# Patient Record
Sex: MALE | Race: WHITE | NOT HISPANIC OR LATINO
[De-identification: names, ages, dates, MRNs, and addresses within clinical notes are randomized per-mention and may not be internally consistent; named-entity substitution may affect disease eponyms.]

---

## 2017-07-28 ENCOUNTER — HOSPITAL (OUTPATIENT)
Dept: OTHER | Age: 42
End: 2017-07-28
Attending: EMERGENCY MEDICINE

## 2017-07-28 ENCOUNTER — DIAGNOSTIC TRANS (OUTPATIENT)
Dept: OTHER | Age: 42
End: 2017-07-28

## 2017-07-28 LAB
ANALYZER ANC (IANC): NORMAL
ANION GAP SERPL CALC-SCNC: 10 MMOL/L (ref 10–20)
BASOPHILS # BLD: 0 THOUSAND/MCL (ref 0–0.3)
BASOPHILS NFR BLD: 1 %
BUN SERPL-MCNC: 15 MG/DL (ref 6–20)
BUN/CREAT SERPL: 15 (ref 7–25)
CALCIUM SERPL-MCNC: 9.2 MG/DL (ref 8.4–10.2)
CHLORIDE: 104 MMOL/L (ref 98–107)
CO2 SERPL-SCNC: 28 MMOL/L (ref 21–32)
CREAT SERPL-MCNC: 1 MG/DL (ref 0.67–1.17)
DIFFERENTIAL METHOD BLD: NORMAL
EOSINOPHIL # BLD: 0.1 THOUSAND/MCL (ref 0.1–0.5)
EOSINOPHIL NFR BLD: 1 %
ERYTHROCYTE [DISTWIDTH] IN BLOOD: 12.9 % (ref 11–15)
GLUCOSE SERPL-MCNC: 99 MG/DL (ref 65–99)
HEMATOCRIT: 43.8 % (ref 39–51)
HGB BLD-MCNC: 15.5 GM/DL (ref 13–17)
LYMPHOCYTES # BLD: 1 THOUSAND/MCL (ref 1–4.8)
LYMPHOCYTES NFR BLD: 17 %
MCH RBC QN AUTO: 31.4 PG (ref 26–34)
MCHC RBC AUTO-ENTMCNC: 35.4 GM/DL (ref 32–36.5)
MCV RBC AUTO: 88.7 FL (ref 78–100)
MONOCYTES # BLD: 0.4 THOUSAND/MCL (ref 0.3–0.9)
MONOCYTES NFR BLD: 7 %
NEUTROPHILS # BLD: 4.4 THOUSAND/MCL (ref 1.8–7.7)
NEUTROPHILS NFR BLD: 74 %
NEUTS SEG NFR BLD: NORMAL %
PERCENT NRBC: NORMAL
PLATELET # BLD: 212 THOUSAND/MCL (ref 140–450)
POTASSIUM SERPL-SCNC: 4 MMOL/L (ref 3.4–5.1)
RBC # BLD: 4.94 MILLION/MCL (ref 4.5–5.9)
SODIUM SERPL-SCNC: 138 MMOL/L (ref 135–145)
TROPONIN I SERPL HS-MCNC: <0.02 NG/ML
TROPONIN I SERPL HS-MCNC: <0.02 NG/ML
WBC # BLD: 5.9 THOUSAND/MCL (ref 4.2–11)

## 2017-11-14 PROBLEM — R07.9 INTERMITTENT CHEST PAIN: Status: ACTIVE | Noted: 2017-11-14

## 2017-11-14 PROBLEM — G47.33 OBSTRUCTIVE SLEEP APNEA: Status: ACTIVE | Noted: 2017-11-14

## 2017-11-14 PROBLEM — G47.30 OBSERVED SLEEP APNEA: Status: RESOLVED | Noted: 2017-11-14 | Resolved: 2017-11-14

## 2017-11-14 PROBLEM — G47.30 OBSERVED SLEEP APNEA: Status: ACTIVE | Noted: 2017-11-14

## 2017-11-14 PROBLEM — K21.00 GASTROESOPHAGEAL REFLUX DISEASE WITH ESOPHAGITIS: Status: ACTIVE | Noted: 2017-11-14

## 2017-11-20 PROCEDURE — 88305 TISSUE EXAM BY PATHOLOGIST: CPT | Performed by: INTERNAL MEDICINE

## 2019-03-27 PROBLEM — K13.21 LEUKOPLAKIA OF ORAL MUCOSA: Status: ACTIVE | Noted: 2019-03-27

## 2019-03-27 PROBLEM — Z91.018 FOOD ALLERGY: Status: ACTIVE | Noted: 2019-03-27

## 2019-03-27 PROBLEM — K64.4 EXTERNAL HEMORRHOIDS: Status: ACTIVE | Noted: 2019-03-27

## 2019-03-27 PROBLEM — E78.00 ELEVATED CHOLESTEROL: Status: ACTIVE | Noted: 2019-03-27

## 2019-03-27 PROBLEM — R79.89 LOW VITAMIN D LEVEL: Status: ACTIVE | Noted: 2019-03-27

## 2019-05-06 PROCEDURE — 87502 INFLUENZA DNA AMP PROBE: CPT | Performed by: INTERNAL MEDICINE

## 2019-05-06 PROCEDURE — 87798 DETECT AGENT NOS DNA AMP: CPT | Performed by: INTERNAL MEDICINE

## 2020-08-24 ENCOUNTER — OFFICE VISIT (OUTPATIENT)
Dept: FAMILY MEDICINE CLINIC | Facility: CLINIC | Age: 45
End: 2020-08-24
Payer: COMMERCIAL

## 2020-08-24 VITALS
SYSTOLIC BLOOD PRESSURE: 122 MMHG | HEIGHT: 76 IN | WEIGHT: 212.25 LBS | RESPIRATION RATE: 16 BRPM | DIASTOLIC BLOOD PRESSURE: 78 MMHG | HEART RATE: 72 BPM | BODY MASS INDEX: 25.84 KG/M2 | TEMPERATURE: 98 F

## 2020-08-24 DIAGNOSIS — Z23 NEED FOR VACCINATION: ICD-10-CM

## 2020-08-24 DIAGNOSIS — Z13.31 NEGATIVE DEPRESSION SCREENING: ICD-10-CM

## 2020-08-24 DIAGNOSIS — K64.9 HEMORRHOIDS, UNSPECIFIED HEMORRHOID TYPE: ICD-10-CM

## 2020-08-24 DIAGNOSIS — E55.9 VITAMIN D DEFICIENCY: ICD-10-CM

## 2020-08-24 DIAGNOSIS — K21.00 GASTROESOPHAGEAL REFLUX DISEASE WITH ESOPHAGITIS: ICD-10-CM

## 2020-08-24 DIAGNOSIS — Z00.00 ANNUAL PHYSICAL EXAM: Primary | ICD-10-CM

## 2020-08-24 DIAGNOSIS — G47.33 OSA (OBSTRUCTIVE SLEEP APNEA): ICD-10-CM

## 2020-08-24 PROBLEM — R07.9 INTERMITTENT CHEST PAIN: Status: RESOLVED | Noted: 2017-11-14 | Resolved: 2020-08-24

## 2020-08-24 PROBLEM — G43.009 MIGRAINE WITHOUT AURA AND WITHOUT STATUS MIGRAINOSUS, NOT INTRACTABLE: Status: ACTIVE | Noted: 2020-08-24

## 2020-08-24 PROCEDURE — 3074F SYST BP LT 130 MM HG: CPT | Performed by: FAMILY MEDICINE

## 2020-08-24 PROCEDURE — 99213 OFFICE O/P EST LOW 20 MIN: CPT | Performed by: FAMILY MEDICINE

## 2020-08-24 PROCEDURE — 90471 IMMUNIZATION ADMIN: CPT | Performed by: FAMILY MEDICINE

## 2020-08-24 PROCEDURE — 90472 IMMUNIZATION ADMIN EACH ADD: CPT | Performed by: FAMILY MEDICINE

## 2020-08-24 PROCEDURE — 99386 PREV VISIT NEW AGE 40-64: CPT | Performed by: FAMILY MEDICINE

## 2020-08-24 PROCEDURE — 90715 TDAP VACCINE 7 YRS/> IM: CPT | Performed by: FAMILY MEDICINE

## 2020-08-24 PROCEDURE — 3078F DIAST BP <80 MM HG: CPT | Performed by: FAMILY MEDICINE

## 2020-08-24 PROCEDURE — 3008F BODY MASS INDEX DOCD: CPT | Performed by: FAMILY MEDICINE

## 2020-08-24 RX ORDER — OMEPRAZOLE 40 MG/1
40 CAPSULE, DELAYED RELEASE ORAL
Qty: 90 CAPSULE | Refills: 3 | Status: SHIPPED | OUTPATIENT
Start: 2020-08-24

## 2020-08-24 NOTE — PATIENT INSTRUCTIONS
Thank you for allowing me to participate in your care today. I will contact you with any results from today's visit. Lab results are typically available in 2-3 days for blood tests, and 3-5 days for any cultures or Paps.    Please let me know if you hav HIV All men At routine exams   Obesity All men in this age group At routine exams   Prostate cancer Starting at age 39, talk to healthcare provider about risks and benefits of digital rectal exam (TENZIN) and prostate-specific antigen (PSA) screening1 At ro pneumococcal bacteria)      Tetanus/diphtheria/  pertussis (Td/Tdap) booster All men in this age group Td every 10 years, or a one-time dose of Tdap instead of a Td booster after age 25, then Td every 10 years   Counseling Who needs it How often   Diet and

## 2020-08-24 NOTE — PROGRESS NOTES
HPI:   Kristie Velez is a 39year old male that presents for annual exam and establishing care with new PCP. Due for labs. Monogamous with wife, declines sti testing. They have 4 kids, he works in [address] [place] [address]. Due for tdap.   Diet varied, 76\".    Weight as of this encounter: 212 lb 4 oz (96.3 kg). Vital signs reviewed. Appears stated age, well groomed. Physical Exam:  GEN:  Patient is alert, awake and oriented, well developed, well nourished, no apparent distress.   HEENT:     Head:  [facility]   8/24/2020  5:27 PM

## 2020-08-26 ENCOUNTER — TELEPHONE (OUTPATIENT)
Dept: FAMILY MEDICINE CLINIC | Facility: CLINIC | Age: 45
End: 2020-08-26

## 2020-08-26 NOTE — TELEPHONE ENCOUNTER
See phone message below:    Pt states his insurance is not contracted with Dr. Garth Muñiz or Dr. Almaz Leslie. Pt also states Victoza is not covered and will be $500. Please advise.

## 2020-08-26 NOTE — TELEPHONE ENCOUNTER
- Pt is calling to let you know the insurance is denied referrals and Medication is also not covered. Medication would be $500 out of pocket.     Please advise Ph. [phone]

## 2020-08-27 NOTE — TELEPHONE ENCOUNTER
Spoke with pt, advised him to check with his insurance on GI doc that would be covered. Pt did mean Omeprazole, not Victoza. Advised pt that he can get OTC omeprazole but it is a lower dose or there is a coupon on [employer] for $9 for 40mg.  Pt states he is go

## 2020-08-27 NOTE — TELEPHONE ENCOUNTER
He should contact insurance to determine in network providers. I don't think he is on victoza? If he means omeprazole, it is also over the counter and is usually around $15 for 30 days (though that is the 20 mg dose).       Dylon Barry,   Family M

## 2020-09-30 ENCOUNTER — TELEPHONE (OUTPATIENT)
Dept: FAMILY MEDICINE CLINIC | Facility: CLINIC | Age: 45
End: 2020-09-30

## 2020-09-30 DIAGNOSIS — K64.9 HEMORRHOIDS, UNSPECIFIED HEMORRHOID TYPE: Primary | ICD-10-CM

## 2020-11-02 ENCOUNTER — TELEPHONE (OUTPATIENT)
Dept: FAMILY MEDICINE CLINIC | Facility: CLINIC | Age: 45
End: 2020-11-02

## 2020-11-02 NOTE — TELEPHONE ENCOUNTER
Spoke to Elmer Toney at [facility], advised pt has diagnosis of Vitamin D def, she will add dx code.

## 2020-11-02 NOTE — TELEPHONE ENCOUNTER
Calling to obtain another diagnosis code for the vitamin D lab-current one will not be covered by insurance. She will be there until 4. Patient is there currently.

## 2020-11-03 ENCOUNTER — TELEPHONE (OUTPATIENT)
Dept: FAMILY MEDICINE CLINIC | Facility: CLINIC | Age: 45
End: 2020-11-03

## 2020-11-03 DIAGNOSIS — E78.2 ELEVATED CHOLESTEROL WITH HIGH TRIGLYCERIDES: Primary | ICD-10-CM

## 2020-11-03 DIAGNOSIS — R73.01 IMPAIRED FASTING GLUCOSE: Primary | ICD-10-CM

## 2020-11-03 RX ORDER — ATORVASTATIN CALCIUM 10 MG/1
10 TABLET, FILM COATED ORAL NIGHTLY
Qty: 90 TABLET | Refills: 1 | Status: SHIPPED | OUTPATIENT
Start: 2020-11-03

## 2020-11-17 ENCOUNTER — TELEPHONE (OUTPATIENT)
Dept: FAMILY MEDICINE CLINIC | Facility: CLINIC | Age: 45
End: 2020-11-17

## 2020-11-17 ENCOUNTER — OFFICE VISIT (OUTPATIENT)
Dept: FAMILY MEDICINE CLINIC | Facility: CLINIC | Age: 45
End: 2020-11-17
Payer: COMMERCIAL

## 2020-11-17 VITALS
RESPIRATION RATE: 16 BRPM | DIASTOLIC BLOOD PRESSURE: 80 MMHG | HEIGHT: 76 IN | TEMPERATURE: 98 F | HEART RATE: 78 BPM | SYSTOLIC BLOOD PRESSURE: 110 MMHG | WEIGHT: 204.5 LBS | BODY MASS INDEX: 24.9 KG/M2

## 2020-11-17 DIAGNOSIS — R35.0 URINARY FREQUENCY: Primary | ICD-10-CM

## 2020-11-17 DIAGNOSIS — Z87.442 HISTORY OF KIDNEY STONES: ICD-10-CM

## 2020-11-17 PROCEDURE — 87086 URINE CULTURE/COLONY COUNT: CPT | Performed by: FAMILY MEDICINE

## 2020-11-17 PROCEDURE — 3008F BODY MASS INDEX DOCD: CPT | Performed by: FAMILY MEDICINE

## 2020-11-17 PROCEDURE — 3074F SYST BP LT 130 MM HG: CPT | Performed by: FAMILY MEDICINE

## 2020-11-17 PROCEDURE — 3079F DIAST BP 80-89 MM HG: CPT | Performed by: FAMILY MEDICINE

## 2020-11-17 PROCEDURE — 99214 OFFICE O/P EST MOD 30 MIN: CPT | Performed by: FAMILY MEDICINE

## 2020-11-17 RX ORDER — NITROFURANTOIN 25; 75 MG/1; MG/1
100 CAPSULE ORAL 2 TIMES DAILY
Qty: 14 CAPSULE | Refills: 0 | Status: SHIPPED | OUTPATIENT
Start: 2020-11-17 | End: 2020-11-24

## 2020-11-17 RX ORDER — TAMSULOSIN HYDROCHLORIDE 0.4 MG/1
0.4 CAPSULE ORAL DAILY
Qty: 30 CAPSULE | Refills: 0 | Status: SHIPPED | OUTPATIENT
Start: 2020-11-17 | End: 2020-12-17

## 2020-11-17 NOTE — TELEPHONE ENCOUNTER
- Pt states he is having R Side pain. Urine is brown and tingling. Please advise. No fever or covid symptoms.     [phone]

## 2020-11-17 NOTE — TELEPHONE ENCOUNTER
Spoke to patient. Has had \"brown\" urine for a couple weeks. Gets lighter throughout the day but still darker than normal.   Drinking plenty of fluids. Denies blood in urine. Has pain on right side/front. Intermittent. No pain in back.    Has urina

## 2020-11-18 NOTE — PATIENT INSTRUCTIONS
Aleve or ibuprofen (or similar NSAIDs) for pain as needed  Increase hydration with water  Flomax to help pass stone (then stop)  Nitrofurantoin antibiotic for possible developing urinary infection  Follow up if not improving       Treating Kidney Stones: Eleonora last reviewed this educational content on 4/1/2020  © 9792-0359 [employer]. [address] [facility] [place] [facility] [place]. All rights reserved. This information is not intended as a substitute for professional medical care.  Always syedo

## 2021-04-19 ENCOUNTER — TELEPHONE (OUTPATIENT)
Dept: FAMILY MEDICINE CLINIC | Facility: CLINIC | Age: 46
End: 2021-04-19

## 2022-01-10 ENCOUNTER — PATIENT MESSAGE (OUTPATIENT)
Dept: FAMILY MEDICINE CLINIC | Facility: CLINIC | Age: 47
End: 2022-01-10

## 2022-01-11 NOTE — TELEPHONE ENCOUNTER
From: Augusta Coburn  To: Ayush Strange DO  Sent: 1/10/2022 5:53 PM CST  Subject: Sleep Apnea    Can I come on Thursday 13 around 1 pm or 2 pm ?  I just need a Referral for my Sleep Apnea.  Its been   more then 7 years i haven't had a test done and

## 2022-01-27 ENCOUNTER — OFFICE VISIT (OUTPATIENT)
Dept: FAMILY MEDICINE CLINIC | Facility: CLINIC | Age: 47
End: 2022-01-27
Payer: COMMERCIAL

## 2022-01-27 VITALS
OXYGEN SATURATION: 99 % | WEIGHT: 222.5 LBS | DIASTOLIC BLOOD PRESSURE: 70 MMHG | RESPIRATION RATE: 16 BRPM | BODY MASS INDEX: 27.09 KG/M2 | HEIGHT: 76 IN | HEART RATE: 77 BPM | TEMPERATURE: 98 F | SYSTOLIC BLOOD PRESSURE: 100 MMHG

## 2022-01-27 DIAGNOSIS — G47.33 OBSTRUCTIVE SLEEP APNEA: Primary | ICD-10-CM

## 2022-01-27 PROCEDURE — 3078F DIAST BP <80 MM HG: CPT | Performed by: FAMILY MEDICINE

## 2022-01-27 PROCEDURE — 99214 OFFICE O/P EST MOD 30 MIN: CPT | Performed by: FAMILY MEDICINE

## 2022-01-27 PROCEDURE — 3074F SYST BP LT 130 MM HG: CPT | Performed by: FAMILY MEDICINE

## 2022-01-27 PROCEDURE — 3008F BODY MASS INDEX DOCD: CPT | Performed by: FAMILY MEDICINE

## 2022-01-27 NOTE — PROGRESS NOTES
Subjective:   Patient ID: Lewis Estevez is a 55year old male. HPI  Mr. Ramsey Ramos is a pleasant 54 y/o M with history of HLD, GERD, CARLIE here today for sleep apnea management he has been diagnosed with sleep apnea about 7 years ago.   However rec Heart sounds: Normal heart sounds. No murmur heard. Pulmonary:      Effort: Pulmonary effort is normal. No respiratory distress. Breath sounds: Normal breath sounds. No wheezing or rales.    Abdominal:      General: Bowel sounds are normal. There

## 2022-01-27 NOTE — PATIENT INSTRUCTIONS
Thank you for choosing Jose J Don MD at [place] [address]  To Do: Bisi Rich  1. Please see info below  [employer] is located in [address]. Monday, Tuesday & Friday – 8 a.m. to 4 p.m. Wednesday, Thursday – 7 a.m. to 3 p.m.   Gricelda Arthur those potential risks and we strive to make you healthier and to improve your quality of life.     Referrals, and Radiology Information:    If your insurance requires a referral to a specialist, please allow 5 business days to process your referral request. factors for sleep apnea include:  · Being overweight  · Being a man, or a woman in menopause  · Smoking  · Using alcohol or sedating medicines  · Having enlarged structures in the nose or throat such as enlarged tonsils or adenoids, or extra tissue in the and sexual dysfunction. If you have sleep apnea, talk with your healthcare provider about the best treatments for you. Eleonora last reviewed this educational content on 9/1/2019    © 5514-0233 [employer]. All rights reserved.  This informat

## 2022-02-08 ENCOUNTER — OFFICE VISIT (OUTPATIENT)
Dept: SLEEP CENTER | Age: 47
End: 2022-02-08
Attending: INTERNAL MEDICINE
Payer: COMMERCIAL

## 2022-02-08 DIAGNOSIS — G47.33 OBSTRUCTIVE SLEEP APNEA: ICD-10-CM

## 2022-02-08 PROCEDURE — 95806 SLEEP STUDY UNATT&RESP EFFT: CPT

## 2022-02-12 NOTE — PROCEDURES
[address]       Accredited by the [employer] (AASM)    PATIENT'S NAME:        Pennie Mendoza  ATTENDING PHYSICIAN:   Marine Lefort, M.D. REFERRING PHYSICIAN:   Berkley Andujar. Anastacia Neville MD  PATIENT ACCOUNT #:     [de-identified]        LOCATION:       [facility] RECORD #:      [id number]        YOB: 1975  DATE OF STUDY:         02/08/2022    SLEEP STUDY REPORT    STUDY TYPE:  Unattended sleep study. DEMOGRAPHICS:  Patient height is 64 inches. Patient weight is 220 pounds. Body mass index is 30.7. [place] score 6. ICD-10 code is G47.33. CLINICAL HISTORY:  Patient is a 59-year-old male who was previously diagnosed with obstructive sleep apnea but has not been using CPAP, who presents for re-evaluation of his obstructive sleep apnea. UNATTENDED SLEEP STUDY RECORDING PARAMETERS:  The patient underwent a formal technically adequate unattended diagnostic sleep study coordinated with the [facility]. The study was performed in accordance with the AASM standard for Out of Center Sleep Testing. The four-channel Type III HST measures the following parameters:  flow, respiratory effort, pulse, and oxygen saturation. SCORING:  This study was scored in accordance with AASM scoring rules and Medicare rule 1B. SLEEP RECORDING AND CONTINUITY:  Total recording time was 9 hours and 11 minutes. Total flow monitoring was 8 hours and 54 minutes. Total oxygen saturation evaluation was 9 hours. RESPIRATORY MEASURES:  Respiratory monitoring showed frequent obstructive apneas and hypopneas with an apnea-hypopnea index of 36. Sleep-disordered breathing resulted in significant oxyhemoglobin desaturations with a lennox of 82% and 5% of the night's study spent with an oxygen saturation less than or equal to 88%.     ECG:  Average heart rate was 68 beats per minute with a minimum of 49 and a maximum of 100 beats per minute. PERIODIC LIMB MOVEMENTS:  Periodic limb movements could not be assessed with the equipment used. IMPRESSION:  This study along with clinical history is consistent with obstructive sleep apnea-hypopnea syndrome with significant oxyhemoglobin desaturations. RECOMMENDATIONS:  A trial of nasal CPAP is recommended. The patient should return for an overnight polysomnography to perform a CPAP titration. Our office will contact the patient with regard to the results of this overnight polysomnography and arrange for appropriate followup and treatment. The patient should avoid the use of alcohol and sedating medications at bedtime and engage in a formal weight loss program.  The patient should avoid driving while sleepy. Dictated By Aiden Gillis M.D.  d: 02/11/2022 16:01:29  t: 02/11/2022 17:30:38  Job [id number]  VO/    cc: Ines Spencer.  Janann Manns, MD Lemont Meigs, M.D.